# Patient Record
Sex: MALE | Race: WHITE | NOT HISPANIC OR LATINO
[De-identification: names, ages, dates, MRNs, and addresses within clinical notes are randomized per-mention and may not be internally consistent; named-entity substitution may affect disease eponyms.]

---

## 2018-11-13 ENCOUNTER — RESULT REVIEW (OUTPATIENT)
Age: 73
End: 2018-11-13

## 2019-01-15 ENCOUNTER — RESULT REVIEW (OUTPATIENT)
Age: 74
End: 2019-01-15

## 2021-08-04 ENCOUNTER — NON-APPOINTMENT (OUTPATIENT)
Age: 76
End: 2021-08-04

## 2021-11-22 ENCOUNTER — APPOINTMENT (OUTPATIENT)
Dept: OTOLARYNGOLOGY | Facility: CLINIC | Age: 76
End: 2021-11-22
Payer: COMMERCIAL

## 2021-11-22 PROCEDURE — G0268 REMOVAL OF IMPACTED WAX MD: CPT

## 2021-11-22 PROCEDURE — 92567 TYMPANOMETRY: CPT

## 2021-11-22 PROCEDURE — 92557 COMPREHENSIVE HEARING TEST: CPT

## 2021-11-22 PROCEDURE — 99203 OFFICE O/P NEW LOW 30 MIN: CPT | Mod: 25

## 2021-12-11 NOTE — PHYSICAL EXAM
[Normal] : mucosa is normal [Midline] : trachea located in midline position [de-identified] : wax removed AU -TMs normal

## 2021-12-11 NOTE — DATA REVIEWED
[de-identified] : Hearing WNL to 1500Hz sloping to a mild to moderate-severe SNHL to 8kHz, AU\par Type As tymp, AD;  Type Ad tymp, AS

## 2021-12-11 NOTE — HISTORY OF PRESENT ILLNESS
[de-identified] : 76M here for eval for HL- left ear HL started about 2 months ago- saw PCP- hx of wax- had it removed but did not help. Pt notes having hearing that wasn’t the best to begin with- with HF HL. Pt notes having lots of pressure in both ears in the past few days. Pt  at [employer] - states not primary area of office- does not get exposed to noise. Pt denies otalgia, otorrhea, ear infections. Hx of seasonal allergies.  Entire mothers side of family with HL - no occupational noise

## 2022-11-21 ENCOUNTER — APPOINTMENT (OUTPATIENT)
Dept: OTOLARYNGOLOGY | Facility: CLINIC | Age: 77
End: 2022-11-21

## 2022-11-21 VITALS
BODY MASS INDEX: 31.1 KG/M2 | HEIGHT: 69 IN | HEART RATE: 71 BPM | WEIGHT: 210 LBS | DIASTOLIC BLOOD PRESSURE: 95 MMHG | SYSTOLIC BLOOD PRESSURE: 166 MMHG

## 2022-11-21 DIAGNOSIS — H61.23 IMPACTED CERUMEN, BILATERAL: ICD-10-CM

## 2022-11-21 DIAGNOSIS — H90.5 UNSPECIFIED SENSORINEURAL HEARING LOSS: ICD-10-CM

## 2022-11-21 PROCEDURE — 92567 TYMPANOMETRY: CPT

## 2022-11-21 PROCEDURE — 92557 COMPREHENSIVE HEARING TEST: CPT

## 2022-11-21 PROCEDURE — G0268 REMOVAL OF IMPACTED WAX MD: CPT

## 2022-11-21 PROCEDURE — 99213 OFFICE O/P EST LOW 20 MIN: CPT | Mod: 25

## 2022-11-21 RX ORDER — TACROLIMUS 1 MG/G
0.1 OINTMENT TOPICAL
Qty: 30 | Refills: 0 | Status: DISCONTINUED | COMMUNITY
Start: 2022-09-10

## 2022-11-21 RX ORDER — CLOBETASOL PROPIONATE 0.5 MG/G
0.05 OINTMENT TOPICAL
Qty: 15 | Refills: 0 | Status: DISCONTINUED | COMMUNITY
Start: 2022-07-30

## 2022-11-21 RX ORDER — CLOBETASOL PROPIONATE 0.05 G/100ML
0.05 SHAMPOO TOPICAL
Qty: 118 | Refills: 0 | Status: DISCONTINUED | COMMUNITY
Start: 2022-11-02

## 2022-11-21 RX ORDER — PENICILLIN V POTASSIUM 500 MG/1
500 TABLET, FILM COATED ORAL
Qty: 30 | Refills: 0 | Status: ACTIVE | COMMUNITY
Start: 2022-10-25

## 2022-11-21 RX ORDER — RAMIPRIL 10 MG/1
10 CAPSULE ORAL
Refills: 0 | Status: ACTIVE | COMMUNITY

## 2022-11-21 RX ORDER — BETAMETHASONE VALERATE 1 MG/G
0.1 CREAM TOPICAL
Qty: 45 | Refills: 0 | Status: DISCONTINUED | COMMUNITY
Start: 2022-06-21

## 2022-11-21 RX ORDER — KETOCONAZOLE 20.5 MG/ML
2 SHAMPOO, SUSPENSION TOPICAL
Qty: 120 | Refills: 0 | Status: ACTIVE | COMMUNITY
Start: 2022-07-30

## 2022-11-21 RX ORDER — ICOSAPENT ETHYL 1000 MG/1
1 CAPSULE ORAL
Refills: 0 | Status: ACTIVE | COMMUNITY

## 2022-11-21 RX ORDER — CLOBETASOL PROPIONATE 0.5 MG/ML
0.05 SOLUTION TOPICAL
Qty: 25 | Refills: 0 | Status: ACTIVE | COMMUNITY
Start: 2022-11-02

## 2022-11-21 RX ORDER — NIRMATRELVIR AND RITONAVIR 300-100 MG
20 X 150 MG & KIT ORAL
Qty: 20 | Refills: 0 | Status: DISCONTINUED | COMMUNITY
Start: 2022-06-25

## 2022-11-21 RX ORDER — CALCIPOTRIENE 50 UG/G
0.01 OINTMENT TOPICAL
Qty: 60 | Refills: 0 | Status: DISCONTINUED | COMMUNITY
Start: 2022-09-10

## 2022-11-21 RX ORDER — CIPROFLOXACIN AND DEXAMETHASONE 3; 1 MG/ML; MG/ML
0.3-0.1 SUSPENSION/ DROPS AURICULAR (OTIC)
Qty: 8 | Refills: 0 | Status: DISCONTINUED | COMMUNITY
Start: 2022-06-14

## 2022-11-21 RX ORDER — FENOFIBRATE 160 MG/1
160 TABLET ORAL
Qty: 30 | Refills: 0 | Status: ACTIVE | COMMUNITY
Start: 2022-06-14

## 2022-11-21 RX ORDER — CHLORPHENIRAMINE MALEATE 4 MG/1
4 TABLET ORAL
Refills: 0 | Status: ACTIVE | COMMUNITY

## 2022-12-04 PROBLEM — H61.23 BILATERAL IMPACTED CERUMEN: Status: ACTIVE | Noted: 2021-12-11

## 2022-12-04 PROBLEM — H90.5 COCHLEAR HEARING LOSS: Status: ACTIVE | Noted: 2021-12-11

## 2022-12-04 NOTE — DATA REVIEWED
[de-identified] : RE: Hearing WNL through 750 Hz sloping to a mild to severe SNHL.\par LE: Hearing WNL through 1 KHz, sloping to a mild to severe SNHL.\par Type As Tymp, RE, and Type A Tymp, LE.

## 2022-12-04 NOTE — PHYSICAL EXAM
[Normal] : mucosa is normal [Midline] : trachea located in midline position [de-identified] : wax removed AU, Tms normal -TMs normal

## 2023-11-27 ENCOUNTER — APPOINTMENT (OUTPATIENT)
Dept: OTOLARYNGOLOGY | Facility: CLINIC | Age: 78
End: 2023-11-27